# Patient Record
Sex: FEMALE | Race: WHITE
[De-identification: names, ages, dates, MRNs, and addresses within clinical notes are randomized per-mention and may not be internally consistent; named-entity substitution may affect disease eponyms.]

---

## 2018-02-06 ENCOUNTER — HOSPITAL ENCOUNTER (EMERGENCY)
Dept: HOSPITAL 94 - ER | Age: 38
Discharge: HOME | End: 2018-02-06
Payer: MEDICAID

## 2018-02-06 VITALS — BODY MASS INDEX: 27.55 KG/M2 | HEIGHT: 60 IN | WEIGHT: 140.3 LBS

## 2018-02-06 VITALS — SYSTOLIC BLOOD PRESSURE: 113 MMHG | DIASTOLIC BLOOD PRESSURE: 61 MMHG

## 2018-02-06 DIAGNOSIS — Z88.5: ICD-10-CM

## 2018-02-06 DIAGNOSIS — F17.210: ICD-10-CM

## 2018-02-06 DIAGNOSIS — M54.6: Primary | ICD-10-CM

## 2018-02-06 DIAGNOSIS — Z79.899: ICD-10-CM

## 2018-02-06 DIAGNOSIS — J45.909: ICD-10-CM

## 2018-02-06 DIAGNOSIS — K21.9: ICD-10-CM

## 2018-02-06 LAB
ALBUMIN SERPL BCP-MCNC: 3.1 G/DL (ref 3.4–5)
ALBUMIN/GLOB SERPL: 0.7 {RATIO} (ref 1.1–1.5)
ALP SERPL-CCNC: 70 IU/L (ref 46–116)
ALT SERPL W P-5'-P-CCNC: 11 U/L (ref 12–78)
ANION GAP SERPL CALCULATED.3IONS-SCNC: 11 MMOL/L (ref 8–16)
AST SERPL W P-5'-P-CCNC: 4 U/L (ref 10–37)
BASOPHILS # BLD AUTO: 0 X10'3 (ref 0–0.2)
BASOPHILS NFR BLD AUTO: 0.2 % (ref 0–1)
BILIRUB SERPL-MCNC: 0.6 MG/DL (ref 0.1–1)
BUN SERPL-MCNC: 8 MG/DL (ref 7–18)
BUN/CREAT SERPL: 13.3 (ref 6.6–38)
CALCIUM SERPL-MCNC: 8.6 MG/DL (ref 8.5–10.1)
CHLORIDE SERPL-SCNC: 104 MMOL/L (ref 99–107)
CO2 SERPL-SCNC: 24.4 MMOL/L (ref 24–32)
CREAT SERPL-MCNC: 0.6 MG/DL (ref 0.4–0.9)
D DIMER PPP FEU-MCNC: 0.39 MG/L FEU (ref 0–0.5)
EOSINOPHIL # BLD AUTO: 0.3 X10'3 (ref 0–0.9)
EOSINOPHIL NFR BLD AUTO: 2.2 % (ref 0–6)
ERYTHROCYTE [DISTWIDTH] IN BLOOD BY AUTOMATED COUNT: 12.8 % (ref 11.5–14.5)
GFR SERPL CREATININE-BSD FRML MDRD: > 90 ML/MIN
GLUCOSE SERPL-MCNC: 123 MG/DL (ref 70–104)
HCT VFR BLD AUTO: 36.8 % (ref 35–45)
HGB BLD-MCNC: 12.8 G/DL (ref 12–16)
LYMPHOCYTES # BLD AUTO: 1.7 X10'3 (ref 1.1–4.8)
LYMPHOCYTES NFR BLD AUTO: 12 % (ref 21–51)
MAGNESIUM SERPL-MCNC: 1.5 MG/DL (ref 1.5–2.4)
MCH RBC QN AUTO: 31 PG (ref 27–31)
MCHC RBC AUTO-ENTMCNC: 34.9 % (ref 33–36.5)
MCV RBC AUTO: 88.9 FL (ref 78–98)
MONOCYTES # BLD AUTO: 0.9 X10'3 (ref 0–0.9)
MONOCYTES NFR BLD AUTO: 6 % (ref 2–12)
NEUTROPHILS # BLD AUTO: 11.3 X10'3 (ref 1.8–7.7)
NEUTROPHILS NFR BLD AUTO: 79.6 % (ref 42–75)
PLATELET # BLD AUTO: 202 X10'3 (ref 140–440)
PMV BLD AUTO: 9.5 FL (ref 7.4–10.4)
POTASSIUM SERPL-SCNC: 2.7 MMOL/L (ref 3.5–5.1)
PROT SERPL-MCNC: 7.4 G/DL (ref 6.4–8.2)
RBC # BLD AUTO: 4.14 X10'6 (ref 4.2–5.6)
SODIUM SERPL-SCNC: 139 MMOL/L (ref 135–145)
WBC # BLD AUTO: 14.2 X10'3 (ref 4.5–11)

## 2018-02-06 PROCEDURE — 96375 TX/PRO/DX INJ NEW DRUG ADDON: CPT

## 2018-02-06 PROCEDURE — 96376 TX/PRO/DX INJ SAME DRUG ADON: CPT

## 2018-02-06 PROCEDURE — 83735 ASSAY OF MAGNESIUM: CPT

## 2018-02-06 PROCEDURE — 84484 ASSAY OF TROPONIN QUANT: CPT

## 2018-02-06 PROCEDURE — 85025 COMPLETE CBC W/AUTO DIFF WBC: CPT

## 2018-02-06 PROCEDURE — 71045 X-RAY EXAM CHEST 1 VIEW: CPT

## 2018-02-06 PROCEDURE — 80053 COMPREHEN METABOLIC PANEL: CPT

## 2018-02-06 PROCEDURE — 36415 COLL VENOUS BLD VENIPUNCTURE: CPT

## 2018-02-06 PROCEDURE — 96374 THER/PROPH/DIAG INJ IV PUSH: CPT

## 2018-02-06 PROCEDURE — 99285 EMERGENCY DEPT VISIT HI MDM: CPT

## 2018-02-06 PROCEDURE — 93005 ELECTROCARDIOGRAM TRACING: CPT

## 2018-02-06 PROCEDURE — 85379 FIBRIN DEGRADATION QUANT: CPT

## 2018-11-14 ENCOUNTER — HOSPITAL ENCOUNTER (EMERGENCY)
Dept: HOSPITAL 94 - ER | Age: 38
Discharge: LEFT BEFORE DISCHARGE | End: 2018-11-14
Payer: MEDICAID

## 2018-11-14 VITALS — DIASTOLIC BLOOD PRESSURE: 62 MMHG | SYSTOLIC BLOOD PRESSURE: 128 MMHG

## 2018-11-14 VITALS — WEIGHT: 156.53 LBS | HEIGHT: 60 IN | BODY MASS INDEX: 30.73 KG/M2

## 2018-11-14 DIAGNOSIS — Z53.21: ICD-10-CM

## 2018-11-14 DIAGNOSIS — M25.511: Primary | ICD-10-CM

## 2019-03-26 ENCOUNTER — HOSPITAL ENCOUNTER (EMERGENCY)
Dept: HOSPITAL 94 - ER | Age: 39
Discharge: HOME | End: 2019-03-26
Payer: MEDICAID

## 2019-03-26 VITALS — BODY MASS INDEX: 28.13 KG/M2 | WEIGHT: 143.3 LBS | HEIGHT: 60 IN

## 2019-03-26 VITALS — DIASTOLIC BLOOD PRESSURE: 40 MMHG | SYSTOLIC BLOOD PRESSURE: 94 MMHG

## 2019-03-26 DIAGNOSIS — Z79.899: ICD-10-CM

## 2019-03-26 DIAGNOSIS — F12.90: ICD-10-CM

## 2019-03-26 DIAGNOSIS — F17.200: ICD-10-CM

## 2019-03-26 DIAGNOSIS — K52.89: Primary | ICD-10-CM

## 2019-03-26 DIAGNOSIS — Z87.442: ICD-10-CM

## 2019-03-26 DIAGNOSIS — K21.9: ICD-10-CM

## 2019-03-26 DIAGNOSIS — R10.12: ICD-10-CM

## 2019-03-26 DIAGNOSIS — Z88.8: ICD-10-CM

## 2019-03-26 DIAGNOSIS — J45.909: ICD-10-CM

## 2019-03-26 DIAGNOSIS — R10.31: ICD-10-CM

## 2019-03-26 DIAGNOSIS — Z88.5: ICD-10-CM

## 2019-03-26 LAB
ALBUMIN SERPL BCP-MCNC: 4 G/DL (ref 3.4–5)
ALBUMIN/GLOB SERPL: 1.1 {RATIO} (ref 1.1–1.5)
ALP SERPL-CCNC: 71 IU/L (ref 46–116)
ALT SERPL W P-5'-P-CCNC: 16 U/L (ref 12–78)
ANION GAP SERPL CALCULATED.3IONS-SCNC: 14 MMOL/L (ref 8–16)
AST SERPL W P-5'-P-CCNC: 9 U/L (ref 10–37)
BACTERIA URNS QL MICRO: (no result) /HPF
BASOPHILS # BLD AUTO: 0.1 X10'3 (ref 0–0.2)
BASOPHILS NFR BLD AUTO: 0.6 % (ref 0–1)
BASOPHILS NFR BLD MANUAL: 1 % (ref 0–1)
BILIRUB SERPL-MCNC: 0.6 MG/DL (ref 0.1–1)
BUN SERPL-MCNC: 17 MG/DL (ref 7–18)
BUN/CREAT SERPL: 22.7 (ref 6.6–38)
CALCIUM SERPL-MCNC: 9.2 MG/DL (ref 8.5–10.1)
CHLORIDE SERPL-SCNC: 104 MMOL/L (ref 99–107)
CLARITY UR: CLEAR
CO2 SERPL-SCNC: 23.2 MMOL/L (ref 24–32)
COLOR UR: YELLOW
CREAT SERPL-MCNC: 0.75 MG/DL (ref 0.4–0.9)
DEPRECATED SQUAMOUS URNS QL MICRO: (no result) /LPF
EOSINOPHIL # BLD AUTO: 0.2 X10'3 (ref 0–0.9)
EOSINOPHIL NFR BLD AUTO: 1.6 % (ref 0–6)
EOSINOPHIL NFR BLD MANUAL: 1 % (ref 0–6)
ERYTHROCYTE [DISTWIDTH] IN BLOOD BY AUTOMATED COUNT: 13.6 % (ref 11.5–14.5)
GFR SERPL CREATININE-BSD FRML MDRD: 87 ML/MIN
GLUCOSE SERPL-MCNC: 93 MG/DL (ref 70–104)
GLUCOSE UR STRIP-MCNC: NEGATIVE MG/DL
HCG UR QL: NEGATIVE
HCT VFR BLD AUTO: 44.6 % (ref 35–45)
HGB BLD-MCNC: 15.3 G/DL (ref 12–16)
HGB UR QL STRIP: NEGATIVE
INR PPP: 1 INR
KETONES UR STRIP-MCNC: 40 MG/DL
LEUKOCYTE ESTERASE UR QL STRIP: NEGATIVE
LG PLATELETS BLD QL SMEAR: (no result)
LIPASE SERPL-CCNC: 131 U/L (ref 73–393)
LYMPHOCYTES # BLD AUTO: 2.1 X10'3 (ref 1.1–4.8)
LYMPHOCYTES NFR BLD AUTO: 14.2 % (ref 21–51)
LYMPHOCYTES NFR BLD MANUAL: 13 % (ref 21–51)
MCH RBC QN AUTO: 30.9 PG (ref 27–31)
MCHC RBC AUTO-ENTMCNC: 34.3 G/DL (ref 33–36.5)
MCV RBC AUTO: 90.1 FL (ref 78–98)
MONOCYTES # BLD AUTO: 1 X10'3 (ref 0–0.9)
MONOCYTES NFR BLD AUTO: 7.2 % (ref 2–12)
MONOCYTES NFR BLD MANUAL: 5 % (ref 2–12)
MUCOUS THREADS URNS QL MICRO: (no result) /LPF
NEUTROPHILS # BLD AUTO: 11.2 X10'3 (ref 1.8–7.7)
NEUTROPHILS NFR BLD AUTO: 76.4 % (ref 42–75)
NEUTS BAND # BLD MANUAL: 3 % (ref 0–10)
NEUTS SEG NFR BLD MANUAL: 75 % (ref 42–75)
NEUTS VAC BLD QL SMEAR: (no result)
NITRITE UR QL STRIP: NEGATIVE
PH UR STRIP: 7 [PH] (ref 4.8–8)
PLATELET # BLD AUTO: 197 X10'3 (ref 140–440)
PLATELET BLD QL SMEAR: NORMAL
PMV BLD AUTO: 10.8 FL (ref 7.4–10.4)
POTASSIUM SERPL-SCNC: 3.6 MMOL/L (ref 3.5–5.1)
PROT SERPL-MCNC: 7.7 G/DL (ref 6.4–8.2)
PROT UR QL STRIP: (no result) MG/DL
PROTHROMBIN TIME: 10 SECONDS (ref 9–12)
RBC # BLD AUTO: 4.95 X10'6 (ref 4.2–5.6)
RBC #/AREA URNS HPF: (no result) /HPF (ref 0–2)
RBC MORPH BLD: NORMAL
SODIUM SERPL-SCNC: 141 MMOL/L (ref 135–145)
SP GR UR STRIP: 1.01 (ref 1–1.03)
TOTAL CELLS COUNTED FLD: 100
TOXIC GRANULES BLD QL SMEAR: (no result)
URN COLLECT METHOD CLASS: (no result)
UROBILINOGEN UR STRIP-MCNC: 0.2 E.U/DL (ref 0.2–1)
VARIANT LYMPHS NFR BLD MANUAL: 2 % (ref 0–0)
WBC # BLD AUTO: 14.6 X10'3 (ref 4.5–11)
WBC #/AREA URNS HPF: (no result) /HPF (ref 0–4)

## 2019-03-26 PROCEDURE — 36415 COLL VENOUS BLD VENIPUNCTURE: CPT

## 2019-03-26 PROCEDURE — 83690 ASSAY OF LIPASE: CPT

## 2019-03-26 PROCEDURE — 96375 TX/PRO/DX INJ NEW DRUG ADDON: CPT

## 2019-03-26 PROCEDURE — 96374 THER/PROPH/DIAG INJ IV PUSH: CPT

## 2019-03-26 PROCEDURE — 81001 URINALYSIS AUTO W/SCOPE: CPT

## 2019-03-26 PROCEDURE — 85025 COMPLETE CBC W/AUTO DIFF WBC: CPT

## 2019-03-26 PROCEDURE — 85610 PROTHROMBIN TIME: CPT

## 2019-03-26 PROCEDURE — 99283 EMERGENCY DEPT VISIT LOW MDM: CPT

## 2019-03-26 PROCEDURE — 81025 URINE PREGNANCY TEST: CPT

## 2019-03-26 PROCEDURE — 80053 COMPREHEN METABOLIC PANEL: CPT

## 2019-04-10 ENCOUNTER — HOSPITAL ENCOUNTER (EMERGENCY)
Dept: HOSPITAL 94 - ER | Age: 39
Discharge: HOME | End: 2019-04-10
Payer: COMMERCIAL

## 2019-04-10 VITALS — HEIGHT: 60 IN | WEIGHT: 145.31 LBS | BODY MASS INDEX: 28.53 KG/M2

## 2019-04-10 VITALS — SYSTOLIC BLOOD PRESSURE: 118 MMHG | DIASTOLIC BLOOD PRESSURE: 89 MMHG
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
## 2019-04-10 DIAGNOSIS — Z87.442: ICD-10-CM

## 2019-04-10 DIAGNOSIS — K21.9: ICD-10-CM
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
## 2019-04-10 DIAGNOSIS — J45.909: ICD-10-CM

## 2019-04-10 DIAGNOSIS — Z88.5: ICD-10-CM

## 2019-04-10 DIAGNOSIS — Z79.899: ICD-10-CM
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
## 2019-04-10 DIAGNOSIS — S29.012A: ICD-10-CM

## 2019-04-10 DIAGNOSIS — S13.4XXA: Primary | ICD-10-CM
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
## 2019-04-10 DIAGNOSIS — R51: ICD-10-CM

## 2019-04-10 DIAGNOSIS — Z88.8: ICD-10-CM

## 2019-04-10 LAB — HCG UR QL: NEGATIVE

## 2019-04-10 PROCEDURE — 72125 CT NECK SPINE W/O DYE: CPT

## 2019-04-10 PROCEDURE — 72131 CT LUMBAR SPINE W/O DYE: CPT

## 2019-04-10 PROCEDURE — 99284 EMERGENCY DEPT VISIT MOD MDM: CPT

## 2019-04-10 PROCEDURE — 72128 CT CHEST SPINE W/O DYE: CPT

## 2019-04-10 PROCEDURE — 81025 URINE PREGNANCY TEST: CPT

## 2019-04-10 PROCEDURE — 96372 THER/PROPH/DIAG INJ SC/IM: CPT

## 2019-04-10 NOTE — NUR
Pt came out of room without C-Collar on, Pt was strongly advised that she 
should keep the collar on until the CT results have returned. Pt verbalized 
understanding of the instructions however did not put it back on.

## 2019-08-07 ENCOUNTER — HOSPITAL ENCOUNTER (EMERGENCY)
Dept: HOSPITAL 94 - ER | Age: 39
Discharge: HOME | End: 2019-08-07
Payer: COMMERCIAL

## 2019-08-07 VITALS — BODY MASS INDEX: 29.86 KG/M2 | HEIGHT: 60 IN | WEIGHT: 152.12 LBS

## 2019-08-07 VITALS — SYSTOLIC BLOOD PRESSURE: 114 MMHG | DIASTOLIC BLOOD PRESSURE: 72 MMHG

## 2019-08-07 DIAGNOSIS — Z88.5: ICD-10-CM

## 2019-08-07 DIAGNOSIS — J45.909: ICD-10-CM

## 2019-08-07 DIAGNOSIS — Z98.890: ICD-10-CM

## 2019-08-07 DIAGNOSIS — K08.89: Primary | ICD-10-CM

## 2019-08-07 DIAGNOSIS — K21.9: ICD-10-CM

## 2019-08-07 DIAGNOSIS — Z87.442: ICD-10-CM

## 2019-08-07 DIAGNOSIS — Z79.899: ICD-10-CM

## 2019-08-07 DIAGNOSIS — Z88.8: ICD-10-CM

## 2019-08-07 PROCEDURE — 99283 EMERGENCY DEPT VISIT LOW MDM: CPT

## 2019-08-25 ENCOUNTER — HOSPITAL ENCOUNTER (EMERGENCY)
Dept: HOSPITAL 94 - ER | Age: 39
Discharge: HOME | End: 2019-08-25
Payer: SELF-PAY

## 2019-08-25 VITALS — DIASTOLIC BLOOD PRESSURE: 76 MMHG | SYSTOLIC BLOOD PRESSURE: 114 MMHG

## 2019-08-25 VITALS — HEIGHT: 60 IN | WEIGHT: 150.31 LBS | BODY MASS INDEX: 29.51 KG/M2
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
## 2019-08-25 DIAGNOSIS — Z88.8: ICD-10-CM

## 2019-08-25 DIAGNOSIS — Z88.5: ICD-10-CM

## 2019-08-25 DIAGNOSIS — J45.909: ICD-10-CM

## 2019-08-25 DIAGNOSIS — Z98.890: ICD-10-CM

## 2019-08-25 DIAGNOSIS — Z87.442: ICD-10-CM

## 2019-08-25 DIAGNOSIS — T18.8XXA: Primary | ICD-10-CM

## 2019-08-25 DIAGNOSIS — K21.9: ICD-10-CM

## 2019-08-25 DIAGNOSIS — Z79.899: ICD-10-CM
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
## 2019-08-25 PROCEDURE — 71045 X-RAY EXAM CHEST 1 VIEW: CPT

## 2019-08-25 PROCEDURE — 99283 EMERGENCY DEPT VISIT LOW MDM: CPT

## 2019-08-25 PROCEDURE — 74018 RADEX ABDOMEN 1 VIEW: CPT

## 2019-08-25 PROCEDURE — 70360 X-RAY EXAM OF NECK: CPT

## 2021-05-21 ENCOUNTER — HOSPITAL ENCOUNTER (EMERGENCY)
Dept: HOSPITAL 94 - ER | Age: 41
LOS: 1 days | Discharge: HOME | End: 2021-05-22
Payer: MEDICAID

## 2021-05-21 VITALS — BODY MASS INDEX: 26.49 KG/M2 | HEIGHT: 61 IN | WEIGHT: 140.3 LBS

## 2021-05-21 DIAGNOSIS — N83.201: Primary | ICD-10-CM

## 2021-05-21 DIAGNOSIS — Z88.8: ICD-10-CM

## 2021-05-21 DIAGNOSIS — K21.9: ICD-10-CM

## 2021-05-21 DIAGNOSIS — Z87.442: ICD-10-CM

## 2021-05-21 DIAGNOSIS — Z79.899: ICD-10-CM

## 2021-05-21 DIAGNOSIS — Z88.5: ICD-10-CM

## 2021-05-21 DIAGNOSIS — J45.909: ICD-10-CM

## 2021-05-21 LAB
ALBUMIN SERPL BCP-MCNC: 3.7 G/DL (ref 3.4–5)
ALBUMIN/GLOB SERPL: 1.1 {RATIO} (ref 1.1–1.5)
ALP SERPL-CCNC: 64 IU/L (ref 46–116)
ALT SERPL W P-5'-P-CCNC: 18 U/L (ref 12–78)
ANION GAP SERPL CALCULATED.3IONS-SCNC: 14 MMOL/L (ref 8–16)
AST SERPL W P-5'-P-CCNC: 11 U/L (ref 10–37)
BASOPHILS # BLD AUTO: 0.2 X10'3 (ref 0–0.2)
BASOPHILS NFR BLD AUTO: 1.6 % (ref 0–1)
BILIRUB SERPL-MCNC: 0.6 MG/DL (ref 0.1–1)
BUN SERPL-MCNC: 8 MG/DL (ref 7–18)
BUN/CREAT SERPL: 11 (ref 6.6–38)
CALCIUM SERPL-MCNC: 8.7 MG/DL (ref 8.5–10.1)
CHLORIDE SERPL-SCNC: 106 MMOL/L (ref 99–107)
CLARITY UR: CLEAR
CO2 SERPL-SCNC: 22.1 MMOL/L (ref 24–32)
COLOR UR: YELLOW
CREAT SERPL-MCNC: 0.73 MG/DL (ref 0.4–0.9)
EOSINOPHIL # BLD AUTO: 0.2 X10'3 (ref 0–0.9)
EOSINOPHIL NFR BLD AUTO: 1.5 % (ref 0–6)
ERYTHROCYTE [DISTWIDTH] IN BLOOD BY AUTOMATED COUNT: 13.3 % (ref 11.5–14.5)
GFR SERPL CREATININE-BSD FRML MDRD: 89 ML/MIN
GLUCOSE SERPL-MCNC: 90 MG/DL (ref 70–104)
GLUCOSE UR STRIP-MCNC: NEGATIVE MG/DL
HCG UR QL: NEGATIVE
HCT VFR BLD AUTO: 39.5 % (ref 35–45)
HGB BLD-MCNC: 13.4 G/DL (ref 12–16)
HGB UR QL STRIP: NEGATIVE
KETONES UR STRIP-MCNC: 15 MG/DL
LEUKOCYTE ESTERASE UR QL STRIP: NEGATIVE
LIPASE SERPL-CCNC: < 50 U/L (ref 73–393)
LYMPHOCYTES # BLD AUTO: 1.5 X10'3 (ref 1.1–4.8)
LYMPHOCYTES NFR BLD AUTO: 13.5 % (ref 21–51)
MCH RBC QN AUTO: 32.4 PG (ref 27–31)
MCHC RBC AUTO-ENTMCNC: 34 G/DL (ref 33–36.5)
MCV RBC AUTO: 95.3 FL (ref 78–98)
MONOCYTES # BLD AUTO: 0.5 X10'3 (ref 0–0.9)
MONOCYTES NFR BLD AUTO: 4.8 % (ref 2–12)
NEUTROPHILS # BLD AUTO: 8.8 X10'3 (ref 1.8–7.7)
NEUTROPHILS NFR BLD AUTO: 78.6 % (ref 42–75)
NITRITE UR QL STRIP: NEGATIVE
PH UR STRIP: 6 [PH] (ref 4.8–8)
PLATELET # BLD AUTO: 180 X10'3 (ref 140–440)
PMV BLD AUTO: 10.7 FL (ref 7.4–10.4)
POTASSIUM SERPL-SCNC: 3.3 MMOL/L (ref 3.5–5.1)
PROT SERPL-MCNC: 7.1 G/DL (ref 6.4–8.2)
PROT UR QL STRIP: NEGATIVE MG/DL
RBC # BLD AUTO: 4.15 X10'6 (ref 4.2–5.6)
SODIUM SERPL-SCNC: 142 MMOL/L (ref 135–145)
SP GR UR STRIP: 1.02 (ref 1–1.03)
URN COLLECT METHOD CLASS: (no result)
UROBILINOGEN UR STRIP-MCNC: 0.2 E.U/DL (ref 0.2–1)
WBC # BLD AUTO: 11.2 X10'3 (ref 4.5–11)

## 2021-05-21 PROCEDURE — 81003 URINALYSIS AUTO W/O SCOPE: CPT

## 2021-05-21 PROCEDURE — 96372 THER/PROPH/DIAG INJ SC/IM: CPT

## 2021-05-21 PROCEDURE — 85025 COMPLETE CBC W/AUTO DIFF WBC: CPT

## 2021-05-21 PROCEDURE — 93976 VASCULAR STUDY: CPT

## 2021-05-21 PROCEDURE — 83690 ASSAY OF LIPASE: CPT

## 2021-05-21 PROCEDURE — 96361 HYDRATE IV INFUSION ADD-ON: CPT

## 2021-05-21 PROCEDURE — 76856 US EXAM PELVIC COMPLETE: CPT

## 2021-05-21 PROCEDURE — 74176 CT ABD & PELVIS W/O CONTRAST: CPT

## 2021-05-21 PROCEDURE — 99285 EMERGENCY DEPT VISIT HI MDM: CPT

## 2021-05-21 PROCEDURE — 96376 TX/PRO/DX INJ SAME DRUG ADON: CPT

## 2021-05-21 PROCEDURE — 81025 URINE PREGNANCY TEST: CPT

## 2021-05-21 PROCEDURE — 96365 THER/PROPH/DIAG IV INF INIT: CPT

## 2021-05-21 PROCEDURE — 96375 TX/PRO/DX INJ NEW DRUG ADDON: CPT

## 2021-05-21 PROCEDURE — 80053 COMPREHEN METABOLIC PANEL: CPT

## 2021-05-22 VITALS — SYSTOLIC BLOOD PRESSURE: 101 MMHG | DIASTOLIC BLOOD PRESSURE: 67 MMHG

## 2022-09-24 ENCOUNTER — HOSPITAL ENCOUNTER (EMERGENCY)
Dept: HOSPITAL 94 - ER | Age: 42
LOS: 1 days | Discharge: LEFT BEFORE DISCHARGE | End: 2022-09-25
Payer: MEDICAID

## 2022-09-24 VITALS — BODY MASS INDEX: 28.44 KG/M2 | WEIGHT: 141.1 LBS | HEIGHT: 59 IN

## 2022-09-24 VITALS — DIASTOLIC BLOOD PRESSURE: 77 MMHG | SYSTOLIC BLOOD PRESSURE: 121 MMHG

## 2022-09-24 DIAGNOSIS — Z53.21: ICD-10-CM

## 2022-09-24 DIAGNOSIS — N20.0: Primary | ICD-10-CM

## 2022-09-24 LAB
ALBUMIN SERPL BCP-MCNC: 3.6 G/DL (ref 3.4–5)
ALBUMIN/GLOB SERPL: 1 {RATIO} (ref 1.1–1.5)
ALP SERPL-CCNC: 67 IU/L (ref 46–116)
ALT SERPL W P-5'-P-CCNC: 14 U/L (ref 12–78)
ANION GAP SERPL CALCULATED.3IONS-SCNC: 7 MMOL/L (ref 8–16)
AST SERPL W P-5'-P-CCNC: 12 U/L (ref 10–37)
BACTERIA URNS QL MICRO: (no result) /HPF
BASOPHILS # BLD AUTO: 0.1 X10'3 (ref 0–0.2)
BASOPHILS NFR BLD AUTO: 0.9 % (ref 0–1)
BILIRUB SERPL-MCNC: 0.4 MG/DL (ref 0.1–1)
BUN SERPL-MCNC: 8 MG/DL (ref 7–18)
BUN/CREAT SERPL: 11.4 (ref 6.6–38)
CALCIUM SERPL-MCNC: 8.8 MG/DL (ref 8.5–10.1)
CAOX CRY URNS QL MICRO: (no result) /HPF
CHLORIDE SERPL-SCNC: 105 MMOL/L (ref 99–107)
CLARITY UR: (no result)
CO2 SERPL-SCNC: 30.3 MMOL/L (ref 24–32)
COLOR UR: YELLOW
CREAT SERPL-MCNC: 0.7 MG/DL (ref 0.4–0.9)
DEPRECATED SQUAMOUS URNS QL MICRO: (no result) /LPF
EOSINOPHIL # BLD AUTO: 0.2 X10'3 (ref 0–0.9)
EOSINOPHIL NFR BLD AUTO: 2.4 % (ref 0–6)
ERYTHROCYTE [DISTWIDTH] IN BLOOD BY AUTOMATED COUNT: 12.9 % (ref 11.5–14.5)
GFR SERPL CREATININE-BSD FRML MDRD: > 90 ML/MIN
GLUCOSE SERPL-MCNC: 101 MG/DL (ref 70–104)
GLUCOSE UR STRIP-MCNC: NEGATIVE MG/DL
HCG UR QL: NEGATIVE
HCT VFR BLD AUTO: 39.2 % (ref 35–45)
HGB BLD-MCNC: 13.6 G/DL (ref 12–16)
HGB UR QL STRIP: (no result)
KETONES UR STRIP-MCNC: (no result) MG/DL
LEUKOCYTE ESTERASE UR QL STRIP: NEGATIVE
LYMPHOCYTES # BLD AUTO: 2.4 X10'3 (ref 1.1–4.8)
LYMPHOCYTES NFR BLD AUTO: 29.3 % (ref 21–51)
MCH RBC QN AUTO: 31.8 PG (ref 27–31)
MCHC RBC AUTO-ENTMCNC: 34.6 G/DL (ref 33–36.5)
MCV RBC AUTO: 91.8 FL (ref 78–98)
MONOCYTES # BLD AUTO: 0.5 X10'3 (ref 0–0.9)
MONOCYTES NFR BLD AUTO: 5.8 % (ref 2–12)
MUCOUS THREADS URNS QL MICRO: (no result) /LPF
NEUTROPHILS # BLD AUTO: 5.1 X10'3 (ref 1.8–7.7)
NEUTROPHILS NFR BLD AUTO: 61.6 % (ref 42–75)
NITRITE UR QL STRIP: NEGATIVE
PH UR STRIP: 6 [PH] (ref 4.8–8)
PLATELET # BLD AUTO: 221 X10'3 (ref 140–440)
PMV BLD AUTO: 9.8 FL (ref 7.4–10.4)
POTASSIUM SERPL-SCNC: 3.7 MMOL/L (ref 3.5–5.1)
PROT SERPL-MCNC: 7.1 G/DL (ref 6.4–8.2)
PROT UR QL STRIP: 30 MG/DL
RBC # BLD AUTO: 4.27 X10'6 (ref 4.2–5.6)
RBC #/AREA URNS HPF: (no result) /HPF (ref 0–2)
SODIUM SERPL-SCNC: 142 MMOL/L (ref 135–145)
SP GR UR STRIP: >=1.03 (ref 1–1.03)
URN COLLECT METHOD CLASS: (no result)
UROBILINOGEN UR STRIP-MCNC: 0.2 E.U/DL (ref 0.2–1)
WBC # BLD AUTO: 8.2 X10'3 (ref 4.5–11)
WBC #/AREA URNS HPF: (no result) /HPF (ref 0–4)

## 2022-09-24 PROCEDURE — 81001 URINALYSIS AUTO W/SCOPE: CPT

## 2022-09-24 PROCEDURE — 80053 COMPREHEN METABOLIC PANEL: CPT

## 2022-09-24 PROCEDURE — 81025 URINE PREGNANCY TEST: CPT

## 2022-09-24 PROCEDURE — 85025 COMPLETE CBC W/AUTO DIFF WBC: CPT

## 2023-05-03 ENCOUNTER — HOSPITAL ENCOUNTER (EMERGENCY)
Dept: HOSPITAL 94 - ER | Age: 43
Discharge: ANOTHER HOSPITAL | End: 2023-05-03
Payer: MEDICAID

## 2023-05-03 VITALS — WEIGHT: 140.3 LBS | HEIGHT: 59 IN | BODY MASS INDEX: 28.28 KG/M2

## 2023-05-03 VITALS — DIASTOLIC BLOOD PRESSURE: 69 MMHG | SYSTOLIC BLOOD PRESSURE: 119 MMHG

## 2023-05-03 DIAGNOSIS — K21.9: ICD-10-CM

## 2023-05-03 DIAGNOSIS — J45.909: ICD-10-CM

## 2023-05-03 DIAGNOSIS — Z79.2: ICD-10-CM

## 2023-05-03 DIAGNOSIS — Z79.899: ICD-10-CM

## 2023-05-03 DIAGNOSIS — R10.31: Primary | ICD-10-CM

## 2023-05-03 DIAGNOSIS — Z79.1: ICD-10-CM

## 2023-05-03 DIAGNOSIS — Z88.5: ICD-10-CM

## 2023-05-03 DIAGNOSIS — Z87.448: ICD-10-CM

## 2023-05-03 DIAGNOSIS — R19.7: ICD-10-CM

## 2023-05-03 DIAGNOSIS — R11.2: ICD-10-CM

## 2023-05-03 DIAGNOSIS — Z88.8: ICD-10-CM

## 2023-05-03 LAB
ALBUMIN SERPL BCP-MCNC: 4.1 G/DL (ref 3.4–5)
ALBUMIN/GLOB SERPL: 1.1 {RATIO} (ref 1.1–1.5)
ALP SERPL-CCNC: 74 IU/L (ref 46–116)
ALT SERPL W P-5'-P-CCNC: 13 U/L (ref 12–78)
ANION GAP SERPL CALCULATED.3IONS-SCNC: 11 MMOL/L (ref 8–16)
AST SERPL W P-5'-P-CCNC: 6 U/L (ref 10–37)
BACTERIA URNS QL MICRO: (no result) /HPF
BASOPHILS # BLD AUTO: 0.1 X10'3 (ref 0–0.2)
BASOPHILS NFR BLD AUTO: 0.5 % (ref 0–1)
BILIRUB SERPL-MCNC: 0.9 MG/DL (ref 0.1–1)
BUN SERPL-MCNC: 12 MG/DL (ref 7–18)
BUN/CREAT SERPL: 14.1 (ref 10–20)
CALCIUM SERPL-MCNC: 9.1 MG/DL (ref 8.5–10.1)
CHLORIDE SERPL-SCNC: 102 MMOL/L (ref 99–107)
CLARITY UR: (no result)
CO2 SERPL-SCNC: 24.6 MMOL/L (ref 24–32)
COLOR UR: YELLOW
CREAT SERPL-MCNC: 0.85 MG/DL (ref 0.4–0.9)
DEPRECATED SQUAMOUS URNS QL MICRO: (no result) /LPF
EOSINOPHIL # BLD AUTO: 0.1 X10'3 (ref 0–0.9)
EOSINOPHIL NFR BLD AUTO: 0.4 % (ref 0–6)
ERYTHROCYTE [DISTWIDTH] IN BLOOD BY AUTOMATED COUNT: 13.1 % (ref 11.5–14.5)
GFR SERPL CREATININE-BSD FRML MDRD: 74 ML/MIN
GLUCOSE SERPL-MCNC: 131 MG/DL (ref 70–104)
GLUCOSE UR STRIP-MCNC: NEGATIVE MG/DL
HCG UR QL: NEGATIVE
HCT VFR BLD AUTO: 45.1 % (ref 35–45)
HGB BLD-MCNC: 15.1 G/DL (ref 12–16)
HGB UR QL STRIP: (no result)
KETONES UR STRIP-MCNC: NEGATIVE MG/DL
LEUKOCYTE ESTERASE UR QL STRIP: NEGATIVE
LIPASE SERPL-CCNC: 62 U/L (ref 73–393)
LYMPHOCYTES # BLD AUTO: 0.7 X10'3 (ref 1.1–4.8)
LYMPHOCYTES NFR BLD AUTO: 4.9 % (ref 21–51)
MCH RBC QN AUTO: 31.7 PG (ref 27–31)
MCHC RBC AUTO-ENTMCNC: 33.6 G/DL (ref 33–36.5)
MCV RBC AUTO: 94.3 FL (ref 78–98)
MONOCYTES # BLD AUTO: 0.4 X10'3 (ref 0–0.9)
MONOCYTES NFR BLD AUTO: 2.6 % (ref 2–12)
MUCOUS THREADS URNS QL MICRO: (no result) /LPF
NEUTROPHILS # BLD AUTO: 12.7 X10'3 (ref 1.8–7.7)
NEUTROPHILS NFR BLD AUTO: 91.6 % (ref 42–75)
NITRITE UR QL STRIP: NEGATIVE
PH UR STRIP: 6 [PH] (ref 4.8–8)
PLATELET # BLD AUTO: 237 X10'3 (ref 140–440)
PMV BLD AUTO: 9.8 FL (ref 7.4–10.4)
POTASSIUM SERPL-SCNC: 3.9 MMOL/L (ref 3.5–5.1)
PROT SERPL-MCNC: 7.9 G/DL (ref 6.4–8.2)
PROT UR QL STRIP: NEGATIVE MG/DL
RBC # BLD AUTO: 4.78 X10'6 (ref 4.2–5.6)
RBC #/AREA URNS HPF: (no result) /HPF (ref 0–2)
SODIUM SERPL-SCNC: 138 MMOL/L (ref 135–145)
SP GR UR STRIP: >=1.03 (ref 1–1.03)
URN COLLECT METHOD CLASS: (no result)
UROBILINOGEN UR STRIP-MCNC: 0.2 E.U/DL (ref 0.2–1)
WBC # BLD AUTO: 13.8 X10'3 (ref 4.5–11)
WBC #/AREA URNS HPF: (no result) /HPF (ref 0–4)

## 2023-05-03 PROCEDURE — 96375 TX/PRO/DX INJ NEW DRUG ADDON: CPT

## 2023-05-03 PROCEDURE — 36415 COLL VENOUS BLD VENIPUNCTURE: CPT

## 2023-05-03 PROCEDURE — 96376 TX/PRO/DX INJ SAME DRUG ADON: CPT

## 2023-05-03 PROCEDURE — 81025 URINE PREGNANCY TEST: CPT

## 2023-05-03 PROCEDURE — 83690 ASSAY OF LIPASE: CPT

## 2023-05-03 PROCEDURE — 81001 URINALYSIS AUTO W/SCOPE: CPT

## 2023-05-03 PROCEDURE — 76830 TRANSVAGINAL US NON-OB: CPT

## 2023-05-03 PROCEDURE — 99285 EMERGENCY DEPT VISIT HI MDM: CPT

## 2023-05-03 PROCEDURE — 74176 CT ABD & PELVIS W/O CONTRAST: CPT

## 2023-05-03 PROCEDURE — 96361 HYDRATE IV INFUSION ADD-ON: CPT

## 2023-05-03 PROCEDURE — 80053 COMPREHEN METABOLIC PANEL: CPT

## 2023-05-03 PROCEDURE — 76856 US EXAM PELVIC COMPLETE: CPT

## 2023-05-03 PROCEDURE — 85025 COMPLETE CBC W/AUTO DIFF WBC: CPT

## 2023-05-03 PROCEDURE — 96374 THER/PROPH/DIAG INJ IV PUSH: CPT

## 2023-05-03 PROCEDURE — 93976 VASCULAR STUDY: CPT

## 2023-08-17 ENCOUNTER — HOSPITAL ENCOUNTER (INPATIENT)
Dept: HOSPITAL 94 - ER | Age: 43
LOS: 3 days | Discharge: HOME | DRG: 244 | End: 2023-08-20
Attending: INTERNAL MEDICINE | Admitting: INTERNAL MEDICINE
Payer: MEDICAID

## 2023-08-17 VITALS
HEART RATE: 119 BPM | SYSTOLIC BLOOD PRESSURE: 114 MMHG | OXYGEN SATURATION: 97 % | RESPIRATION RATE: 18 BRPM | TEMPERATURE: 98.1 F | DIASTOLIC BLOOD PRESSURE: 76 MMHG

## 2023-08-17 VITALS — TEMPERATURE: 100 F

## 2023-08-17 VITALS
TEMPERATURE: 100.9 F | SYSTOLIC BLOOD PRESSURE: 115 MMHG | DIASTOLIC BLOOD PRESSURE: 72 MMHG | RESPIRATION RATE: 18 BRPM | HEART RATE: 110 BPM | OXYGEN SATURATION: 100 %

## 2023-08-17 VITALS — HEIGHT: 60 IN | WEIGHT: 140.3 LBS | BODY MASS INDEX: 27.55 KG/M2

## 2023-08-17 VITALS — RESPIRATION RATE: 18 BRPM

## 2023-08-17 DIAGNOSIS — K57.32: Primary | ICD-10-CM

## 2023-08-17 DIAGNOSIS — K21.9: ICD-10-CM

## 2023-08-17 DIAGNOSIS — J45.909: ICD-10-CM

## 2023-08-17 DIAGNOSIS — Z71.51: ICD-10-CM

## 2023-08-17 DIAGNOSIS — Z87.442: ICD-10-CM

## 2023-08-17 DIAGNOSIS — Z71.6: ICD-10-CM

## 2023-08-17 DIAGNOSIS — G89.29: ICD-10-CM

## 2023-08-17 DIAGNOSIS — F17.210: ICD-10-CM

## 2023-08-17 DIAGNOSIS — M32.9: ICD-10-CM

## 2023-08-17 DIAGNOSIS — K56.600: ICD-10-CM

## 2023-08-17 DIAGNOSIS — E87.6: ICD-10-CM

## 2023-08-17 DIAGNOSIS — Z79.899: ICD-10-CM

## 2023-08-17 DIAGNOSIS — Z88.8: ICD-10-CM

## 2023-08-17 DIAGNOSIS — Z88.5: ICD-10-CM

## 2023-08-17 DIAGNOSIS — F12.10: ICD-10-CM

## 2023-08-17 LAB
ALBUMIN SERPL BCP-MCNC: 3.3 G/DL (ref 3.4–5)
ALBUMIN/GLOB SERPL: 1 {RATIO} (ref 1.1–1.5)
ALP SERPL-CCNC: 71 IU/L (ref 46–116)
ALT SERPL W P-5'-P-CCNC: 15 U/L (ref 12–78)
AMORPH URATE CRY #/AREA URNS HPF: (no result) /[HPF]
AMPHETAMINES UR QL SCN: NEGATIVE
ANION GAP SERPL CALCULATED.3IONS-SCNC: 10 MMOL/L (ref 8–16)
APTT PPP: 27 SECONDS (ref 22–32)
AST SERPL W P-5'-P-CCNC: 11 U/L (ref 10–37)
BACTERIA URNS QL MICRO: (no result) /HPF
BARBITURATES UR QL SCN: NEGATIVE
BASOPHILS # BLD AUTO: 0.1 X10'3 (ref 0–0.2)
BASOPHILS NFR BLD AUTO: 0.5 % (ref 0–1)
BENZODIAZ UR QL SCN: NEGATIVE
BILIRUB SERPL-MCNC: 1 MG/DL (ref 0.1–1)
BILIRUB UR QL STRIP: NEGATIVE
BUN SERPL-MCNC: 15 MG/DL (ref 7–18)
BUN/CREAT SERPL: 18.5 (ref 10–20)
BZE UR QL SCN: POSITIVE
CALCIUM SERPL-MCNC: 8.6 MG/DL (ref 8.5–10.1)
CANNABINOIDS UR QL SCN: POSITIVE
CAOX CRY URNS QL MICRO: (no result) /HPF
CHLORIDE SERPL-SCNC: 104 MMOL/L (ref 99–107)
CLARITY UR: (no result)
CO2 SERPL-SCNC: 25.1 MMOL/L (ref 24–32)
COLOR UR: YELLOW
CREAT CL PREDICTED SERPL C-G-VRATE: 66 ML/MIN
CREAT SERPL-MCNC: 0.81 MG/DL (ref 0.4–0.9)
DEPRECATED SQUAMOUS URNS QL MICRO: (no result) /LPF
EOSINOPHIL # BLD AUTO: 0 X10'3 (ref 0–0.9)
EOSINOPHIL NFR BLD AUTO: 0.2 % (ref 0–6)
ERYTHROCYTE [DISTWIDTH] IN BLOOD BY AUTOMATED COUNT: 13.9 % (ref 11.5–14.5)
ETHANOL SERPL-MCNC: < 10 MG/DL (ref ?–10)
GFR SERPL CREATININE-BSD FRML MDRD: 78 ML/MIN
GLOBULIN SER CALC-MCNC: 3.2 G/DL (ref 2.7–4.3)
GLUCOSE SERPL-MCNC: 98 MG/DL (ref 70–104)
GLUCOSE UR STRIP-MCNC: NEGATIVE MG/DL
HCG SERPL-ACNC: < 1 MIU/ML
HCG UR QL: NEGATIVE
HCT VFR BLD AUTO: 37.7 % (ref 35–45)
HGB BLD-MCNC: 12.8 G/DL (ref 12–16)
HGB UR QL STRIP: NEGATIVE
INR PPP: 0.9 INR
KETONES UR STRIP-MCNC: NEGATIVE MG/DL
LEUKOCYTE ESTERASE UR QL STRIP: NEGATIVE
LIPASE SERPL-CCNC: 54 U/L (ref 73–393)
LYMPHOCYTES # BLD AUTO: 0.9 X10'3 (ref 1.1–4.8)
LYMPHOCYTES NFR BLD AUTO: 5 % (ref 21–51)
MCH RBC QN AUTO: 32.1 PG (ref 27–31)
MCHC RBC AUTO-ENTMCNC: 33.8 G/DL (ref 33–36.5)
MCV RBC AUTO: 94.9 FL (ref 78–98)
METHADONE UR QL SCN: NEGATIVE
MONOCYTES # BLD AUTO: 1.3 X10'3 (ref 0–0.9)
MONOCYTES NFR BLD AUTO: 7.2 % (ref 2–12)
MUCOUS THREADS URNS QL MICRO: (no result) /LPF
NEUTROPHILS # BLD AUTO: 15.8 X10'3 (ref 1.8–7.7)
NEUTROPHILS NFR BLD AUTO: 87.1 % (ref 42–75)
NITRITE UR QL STRIP: NEGATIVE
OPIATES UR QL SCN: POSITIVE
PCP UR QL SCN: NEGATIVE
PH UR STRIP: 5.5 [PH] (ref 4.8–8)
PLATELET # BLD AUTO: 186 X10'3 (ref 140–440)
PMV BLD AUTO: 9.5 FL (ref 7.4–10.4)
POTASSIUM SERPL-SCNC: 3 MMOL/L (ref 3.5–5.1)
PROT SERPL-MCNC: 6.5 G/DL (ref 6.4–8.2)
PROT UR QL STRIP: NEGATIVE MG/DL
PROTHROMBIN TIME: 9.9 SECONDS (ref 9–12)
RBC # BLD AUTO: 3.98 X10'6 (ref 4.2–5.6)
RBC #/AREA URNS HPF: (no result) /HPF (ref 0–2)
SODIUM SERPL-SCNC: 139 MMOL/L (ref 135–145)
SP GR UR STRIP: 1.01 (ref 1–1.03)
TRANS CELLS URNS QL MICRO: (no result) /HPF
URN COLLECT METHOD CLASS: (no result)
UROBILINOGEN UR STRIP-MCNC: 0.2 E.U/DL (ref 0.2–1)
WBC # BLD AUTO: 18.2 X10'3 (ref 4.5–11)

## 2023-08-17 PROCEDURE — 74177 CT ABD & PELVIS W/CONTRAST: CPT

## 2023-08-17 PROCEDURE — 0D9670Z DRAINAGE OF STOMACH WITH DRAINAGE DEVICE, VIA NATURAL OR ARTIFICIAL OPENING: ICD-10-PCS | Performed by: INTERNAL MEDICINE

## 2023-08-17 PROCEDURE — 81025 URINE PREGNANCY TEST: CPT

## 2023-08-17 PROCEDURE — 84145 PROCALCITONIN (PCT): CPT

## 2023-08-17 PROCEDURE — 93976 VASCULAR STUDY: CPT

## 2023-08-17 PROCEDURE — 87081 CULTURE SCREEN ONLY: CPT

## 2023-08-17 PROCEDURE — 83605 ASSAY OF LACTIC ACID: CPT

## 2023-08-17 PROCEDURE — BW211ZZ COMPUTERIZED TOMOGRAPHY (CT SCAN) OF ABDOMEN AND PELVIS USING LOW OSMOLAR CONTRAST: ICD-10-PCS

## 2023-08-17 PROCEDURE — 87040 BLOOD CULTURE FOR BACTERIA: CPT

## 2023-08-17 PROCEDURE — 85730 THROMBOPLASTIN TIME PARTIAL: CPT

## 2023-08-17 PROCEDURE — 85651 RBC SED RATE NONAUTOMATED: CPT

## 2023-08-17 PROCEDURE — 81001 URINALYSIS AUTO W/SCOPE: CPT

## 2023-08-17 PROCEDURE — 76830 TRANSVAGINAL US NON-OB: CPT

## 2023-08-17 PROCEDURE — 83690 ASSAY OF LIPASE: CPT

## 2023-08-17 PROCEDURE — 84484 ASSAY OF TROPONIN QUANT: CPT

## 2023-08-17 PROCEDURE — 99285 EMERGENCY DEPT VISIT HI MDM: CPT

## 2023-08-17 PROCEDURE — 36415 COLL VENOUS BLD VENIPUNCTURE: CPT

## 2023-08-17 PROCEDURE — 76856 US EXAM PELVIC COMPLETE: CPT

## 2023-08-17 PROCEDURE — 93005 ELECTROCARDIOGRAM TRACING: CPT

## 2023-08-17 PROCEDURE — 85025 COMPLETE CBC W/AUTO DIFF WBC: CPT

## 2023-08-17 PROCEDURE — 85610 PROTHROMBIN TIME: CPT

## 2023-08-17 PROCEDURE — 71045 X-RAY EXAM CHEST 1 VIEW: CPT

## 2023-08-17 PROCEDURE — 80305 DRUG TEST PRSMV DIR OPT OBS: CPT

## 2023-08-17 PROCEDURE — 84702 CHORIONIC GONADOTROPIN TEST: CPT

## 2023-08-17 PROCEDURE — 80053 COMPREHEN METABOLIC PANEL: CPT

## 2023-08-17 PROCEDURE — 80320 DRUG SCREEN QUANTALCOHOLS: CPT

## 2023-08-17 RX ADMIN — DIATRIZOATE MEGLUMINE AND DIATRIZOATE SODIUM SCH ML: 660; 100 LIQUID ORAL; RECTAL at 21:21

## 2023-08-17 RX ADMIN — TAZOBACTAM SODIUM AND PIPERACILLIN SODIUM SCH MLS/HR: 375; 3 INJECTION, SOLUTION INTRAVENOUS at 18:44

## 2023-08-17 RX ADMIN — SODIUM CHLORIDE SCH MLS/HR: 9 INJECTION INTRAMUSCULAR; INTRAVENOUS; SUBCUTANEOUS at 11:40

## 2023-08-17 RX ADMIN — HYDROMORPHONE HYDROCHLORIDE PRN MG: 1 INJECTION, SOLUTION INTRAMUSCULAR; INTRAVENOUS; SUBCUTANEOUS at 21:27

## 2023-08-17 RX ADMIN — ENOXAPARIN SODIUM SCH MG: 100 INJECTION SUBCUTANEOUS at 19:32

## 2023-08-17 RX ADMIN — HYDROCODONE BITARTRATE AND ACETAMINOPHEN PRN TAB: 10; 325 TABLET ORAL at 19:33

## 2023-08-17 RX ADMIN — HYDROMORPHONE HYDROCHLORIDE PRN MG: 1 INJECTION, SOLUTION INTRAMUSCULAR; INTRAVENOUS; SUBCUTANEOUS at 17:39

## 2023-08-17 NOTE — NUR
NGT placement attempted and pt unable to tolerate. Pt requesting pain med and 
asking for numbing agent for tube placement. Dr. Dominguez paged for request

## 2023-08-17 NOTE — NUR
Patient in room [facility]. I have received report from SHIVANI Goel and had the opportunity to 
ask questions and assume patient care.

## 2023-08-18 VITALS
OXYGEN SATURATION: 96 % | SYSTOLIC BLOOD PRESSURE: 108 MMHG | TEMPERATURE: 98.7 F | RESPIRATION RATE: 17 BRPM | DIASTOLIC BLOOD PRESSURE: 70 MMHG | HEART RATE: 101 BPM

## 2023-08-18 VITALS
DIASTOLIC BLOOD PRESSURE: 83 MMHG | OXYGEN SATURATION: 96 % | SYSTOLIC BLOOD PRESSURE: 127 MMHG | HEART RATE: 119 BPM | RESPIRATION RATE: 20 BRPM | TEMPERATURE: 99.3 F

## 2023-08-18 VITALS
TEMPERATURE: 97.5 F | OXYGEN SATURATION: 97 % | RESPIRATION RATE: 16 BRPM | SYSTOLIC BLOOD PRESSURE: 121 MMHG | DIASTOLIC BLOOD PRESSURE: 78 MMHG | HEART RATE: 98 BPM

## 2023-08-18 VITALS
TEMPERATURE: 99.7 F | SYSTOLIC BLOOD PRESSURE: 124 MMHG | OXYGEN SATURATION: 97 % | DIASTOLIC BLOOD PRESSURE: 93 MMHG | HEART RATE: 112 BPM | RESPIRATION RATE: 18 BRPM

## 2023-08-18 VITALS — OXYGEN SATURATION: 97 % | RESPIRATION RATE: 16 BRPM

## 2023-08-18 LAB
ALBUMIN SERPL BCP-MCNC: 2.6 G/DL (ref 3.4–5)
ALBUMIN/GLOB SERPL: 0.8 {RATIO} (ref 1.1–1.5)
ALP SERPL-CCNC: 62 IU/L (ref 46–116)
ALT SERPL W P-5'-P-CCNC: 11 U/L (ref 12–78)
ANION GAP SERPL CALCULATED.3IONS-SCNC: 9 MMOL/L (ref 8–16)
AST SERPL W P-5'-P-CCNC: 11 U/L (ref 10–37)
BASOPHILS # BLD AUTO: 0 X10'3 (ref 0–0.2)
BASOPHILS NFR BLD AUTO: 0.1 % (ref 0–1)
BILIRUB SERPL-MCNC: 0.7 MG/DL (ref 0.1–1)
BUN SERPL-MCNC: 6 MG/DL (ref 7–18)
BUN/CREAT SERPL: 9.2 (ref 10–20)
CALCIUM SERPL-MCNC: 8 MG/DL (ref 8.5–10.1)
CHLORIDE SERPL-SCNC: 104 MMOL/L (ref 99–107)
CO2 SERPL-SCNC: 20.6 MMOL/L (ref 24–32)
CREAT CL PREDICTED SERPL C-G-VRATE: 82 ML/MIN
CREAT SERPL-MCNC: 0.65 MG/DL (ref 0.4–0.9)
EOSINOPHIL # BLD AUTO: 0.1 X10'3 (ref 0–0.9)
EOSINOPHIL NFR BLD AUTO: 0.4 % (ref 0–6)
ERYTHROCYTE [DISTWIDTH] IN BLOOD BY AUTOMATED COUNT: 13.5 % (ref 11.5–14.5)
GFR SERPL CREATININE-BSD FRML MDRD: > 90 ML/MIN
GLOBULIN SER CALC-MCNC: 3.3 G/DL (ref 2.7–4.3)
GLUCOSE SERPL-MCNC: 89 MG/DL (ref 70–104)
HCT VFR BLD AUTO: 36.5 % (ref 35–45)
HGB BLD-MCNC: 12.2 G/DL (ref 12–16)
LYMPHOCYTES # BLD AUTO: 0.8 X10'3 (ref 1.1–4.8)
LYMPHOCYTES NFR BLD AUTO: 4.6 % (ref 21–51)
MCH RBC QN AUTO: 31.7 PG (ref 27–31)
MCHC RBC AUTO-ENTMCNC: 33.5 G/DL (ref 33–36.5)
MCV RBC AUTO: 94.6 FL (ref 78–98)
MONOCYTES # BLD AUTO: 0.4 X10'3 (ref 0–0.9)
MONOCYTES NFR BLD AUTO: 2.5 % (ref 2–12)
NEUTROPHILS # BLD AUTO: 15.7 X10'3 (ref 1.8–7.7)
NEUTROPHILS NFR BLD AUTO: 92.4 % (ref 42–75)
PLATELET # BLD AUTO: 153 X10'3 (ref 140–440)
PMV BLD AUTO: 10.1 FL (ref 7.4–10.4)
POTASSIUM SERPL-SCNC: 3.6 MMOL/L (ref 3.5–5.1)
PROT SERPL-MCNC: 5.9 G/DL (ref 6.4–8.2)
RBC # BLD AUTO: 3.86 X10'6 (ref 4.2–5.6)
SODIUM SERPL-SCNC: 134 MMOL/L (ref 135–145)
WBC # BLD AUTO: 17 X10'3 (ref 4.5–11)

## 2023-08-18 PROCEDURE — BW211ZZ COMPUTERIZED TOMOGRAPHY (CT SCAN) OF ABDOMEN AND PELVIS USING LOW OSMOLAR CONTRAST: ICD-10-PCS

## 2023-08-18 RX ADMIN — HYDROMORPHONE HYDROCHLORIDE PRN MG: 1 INJECTION, SOLUTION INTRAMUSCULAR; INTRAVENOUS; SUBCUTANEOUS at 11:31

## 2023-08-18 RX ADMIN — HYDROMORPHONE HYDROCHLORIDE PRN MG: 1 INJECTION, SOLUTION INTRAMUSCULAR; INTRAVENOUS; SUBCUTANEOUS at 07:22

## 2023-08-18 RX ADMIN — SODIUM CHLORIDE SCH MLS/HR: 9 INJECTION INTRAMUSCULAR; INTRAVENOUS; SUBCUTANEOUS at 17:40

## 2023-08-18 RX ADMIN — HYDROCODONE BITARTRATE AND ACETAMINOPHEN PRN TAB: 10; 325 TABLET ORAL at 04:51

## 2023-08-18 RX ADMIN — ONDANSETRON PRN MG: 2 INJECTION, SOLUTION INTRAMUSCULAR; INTRAVENOUS at 07:29

## 2023-08-18 RX ADMIN — HYDROMORPHONE HYDROCHLORIDE PRN MG: 1 INJECTION, SOLUTION INTRAMUSCULAR; INTRAVENOUS; SUBCUTANEOUS at 20:35

## 2023-08-18 RX ADMIN — HYDROCODONE BITARTRATE AND ACETAMINOPHEN PRN TAB: 10; 325 TABLET ORAL at 18:46

## 2023-08-18 RX ADMIN — HYDROCODONE BITARTRATE AND ACETAMINOPHEN PRN TAB: 10; 325 TABLET ORAL at 14:22

## 2023-08-18 RX ADMIN — TAZOBACTAM SODIUM AND PIPERACILLIN SODIUM SCH MLS/HR: 375; 3 INJECTION, SOLUTION INTRAVENOUS at 09:48

## 2023-08-18 RX ADMIN — ENOXAPARIN SODIUM SCH MG: 100 INJECTION SUBCUTANEOUS at 20:36

## 2023-08-18 RX ADMIN — SODIUM CHLORIDE SCH MLS/HR: 9 INJECTION INTRAMUSCULAR; INTRAVENOUS; SUBCUTANEOUS at 20:37

## 2023-08-18 RX ADMIN — HYDROMORPHONE HYDROCHLORIDE PRN MG: 1 INJECTION, SOLUTION INTRAMUSCULAR; INTRAVENOUS; SUBCUTANEOUS at 02:00

## 2023-08-18 RX ADMIN — SODIUM CHLORIDE SCH MLS/HR: 9 INJECTION INTRAMUSCULAR; INTRAVENOUS; SUBCUTANEOUS at 07:40

## 2023-08-18 RX ADMIN — HYDROMORPHONE HYDROCHLORIDE PRN MG: 1 INJECTION, SOLUTION INTRAMUSCULAR; INTRAVENOUS; SUBCUTANEOUS at 15:53

## 2023-08-18 RX ADMIN — DIATRIZOATE MEGLUMINE AND DIATRIZOATE SODIUM SCH ML: 660; 100 LIQUID ORAL; RECTAL at 07:22

## 2023-08-18 RX ADMIN — HYDROCODONE BITARTRATE AND ACETAMINOPHEN PRN TAB: 10; 325 TABLET ORAL at 00:15

## 2023-08-18 RX ADMIN — SODIUM CHLORIDE SCH MLS/HR: 9 INJECTION INTRAMUSCULAR; INTRAVENOUS; SUBCUTANEOUS at 00:13

## 2023-08-18 RX ADMIN — HYDROCODONE BITARTRATE AND ACETAMINOPHEN PRN TAB: 10; 325 TABLET ORAL at 23:40

## 2023-08-18 RX ADMIN — HYDROCODONE BITARTRATE AND ACETAMINOPHEN PRN TAB: 10; 325 TABLET ORAL at 10:21

## 2023-08-18 RX ADMIN — TAZOBACTAM SODIUM AND PIPERACILLIN SODIUM SCH MLS/HR: 375; 3 INJECTION, SOLUTION INTRAVENOUS at 00:13

## 2023-08-18 RX ADMIN — DIATRIZOATE MEGLUMINE AND DIATRIZOATE SODIUM SCH ML: 660; 100 LIQUID ORAL; RECTAL at 21:00

## 2023-08-18 RX ADMIN — TAZOBACTAM SODIUM AND PIPERACILLIN SODIUM SCH MLS/HR: 375; 3 INJECTION, SOLUTION INTRAVENOUS at 15:53

## 2023-08-18 RX ADMIN — TAZOBACTAM SODIUM AND PIPERACILLIN SODIUM SCH MLS/HR: 375; 3 INJECTION, SOLUTION INTRAVENOUS at 23:41

## 2023-08-18 NOTE — NUR
Rn got a call from Dr Myers after he read CT scan, reported patient has acute diverticulitis 
and samll micro perforation found in CT.  stated he is going to send over report. RN 
called Dr Sparrow cell phone to make aware of findings. Woman who answered said he will have 
to call back due to being busy at the moment. RN then paged Magu to make aware of CT verbal 
report

## 2023-08-18 NOTE — NUR
Problems reprioritized. Patient report given, questions answered & plan of care reviewed 
with SHIVANI Goel.

## 2023-08-19 VITALS
DIASTOLIC BLOOD PRESSURE: 79 MMHG | TEMPERATURE: 92.7 F | HEART RATE: 97 BPM | SYSTOLIC BLOOD PRESSURE: 121 MMHG | OXYGEN SATURATION: 96 % | RESPIRATION RATE: 15 BRPM

## 2023-08-19 VITALS
HEART RATE: 89 BPM | SYSTOLIC BLOOD PRESSURE: 124 MMHG | OXYGEN SATURATION: 95 % | TEMPERATURE: 98.3 F | RESPIRATION RATE: 16 BRPM | DIASTOLIC BLOOD PRESSURE: 82 MMHG

## 2023-08-19 VITALS
TEMPERATURE: 96.9 F | HEART RATE: 101 BPM | OXYGEN SATURATION: 94 % | DIASTOLIC BLOOD PRESSURE: 74 MMHG | SYSTOLIC BLOOD PRESSURE: 118 MMHG | RESPIRATION RATE: 14 BRPM

## 2023-08-19 VITALS — RESPIRATION RATE: 16 BRPM | OXYGEN SATURATION: 96 %

## 2023-08-19 VITALS
OXYGEN SATURATION: 98 % | RESPIRATION RATE: 14 BRPM | SYSTOLIC BLOOD PRESSURE: 118 MMHG | TEMPERATURE: 98.4 F | HEART RATE: 90 BPM | DIASTOLIC BLOOD PRESSURE: 83 MMHG

## 2023-08-19 VITALS — RESPIRATION RATE: 14 BRPM | OXYGEN SATURATION: 95 %

## 2023-08-19 LAB
ALBUMIN SERPL BCP-MCNC: 2.7 G/DL (ref 3.4–5)
ALBUMIN/GLOB SERPL: 0.7 {RATIO} (ref 1.1–1.5)
ALP SERPL-CCNC: 71 IU/L (ref 46–116)
ALT SERPL W P-5'-P-CCNC: 13 U/L (ref 12–78)
ANION GAP SERPL CALCULATED.3IONS-SCNC: 12 MMOL/L (ref 8–16)
AST SERPL W P-5'-P-CCNC: 12 U/L (ref 10–37)
BASOPHILS # BLD AUTO: 0 X10'3 (ref 0–0.2)
BASOPHILS NFR BLD AUTO: 0.2 % (ref 0–1)
BILIRUB SERPL-MCNC: 0.7 MG/DL (ref 0.1–1)
BUN SERPL-MCNC: 6 MG/DL (ref 7–18)
BUN/CREAT SERPL: 8.1 (ref 10–20)
CALCIUM SERPL-MCNC: 8.8 MG/DL (ref 8.5–10.1)
CHLORIDE SERPL-SCNC: 103 MMOL/L (ref 99–107)
CO2 SERPL-SCNC: 22.1 MMOL/L (ref 24–32)
CREAT CL PREDICTED SERPL C-G-VRATE: 72 ML/MIN
CREAT SERPL-MCNC: 0.74 MG/DL (ref 0.4–0.9)
EOSINOPHIL # BLD AUTO: 0.3 X10'3 (ref 0–0.9)
EOSINOPHIL NFR BLD AUTO: 2.6 % (ref 0–6)
ERYTHROCYTE [DISTWIDTH] IN BLOOD BY AUTOMATED COUNT: 13.7 % (ref 11.5–14.5)
GFR SERPL CREATININE-BSD FRML MDRD: 86 ML/MIN
GLOBULIN SER CALC-MCNC: 4.1 G/DL (ref 2.7–4.3)
GLUCOSE SERPL-MCNC: 91 MG/DL (ref 70–104)
HCT VFR BLD AUTO: 38.4 % (ref 35–45)
HGB BLD-MCNC: 12.8 G/DL (ref 12–16)
LYMPHOCYTES # BLD AUTO: 1 X10'3 (ref 1.1–4.8)
LYMPHOCYTES NFR BLD AUTO: 7.9 % (ref 21–51)
MCH RBC QN AUTO: 32.1 PG (ref 27–31)
MCHC RBC AUTO-ENTMCNC: 33.5 G/DL (ref 33–36.5)
MCV RBC AUTO: 95.8 FL (ref 78–98)
MONOCYTES # BLD AUTO: 0.5 X10'3 (ref 0–0.9)
MONOCYTES NFR BLD AUTO: 3.8 % (ref 2–12)
NEUTROPHILS # BLD AUTO: 10.7 X10'3 (ref 1.8–7.7)
NEUTROPHILS NFR BLD AUTO: 85.5 % (ref 42–75)
PLATELET # BLD AUTO: 157 X10'3 (ref 140–440)
PMV BLD AUTO: 10.1 FL (ref 7.4–10.4)
POTASSIUM SERPL-SCNC: 3.7 MMOL/L (ref 3.5–5.1)
PROT SERPL-MCNC: 6.8 G/DL (ref 6.4–8.2)
RBC # BLD AUTO: 4 X10'6 (ref 4.2–5.6)
SODIUM SERPL-SCNC: 137 MMOL/L (ref 135–145)
WBC # BLD AUTO: 12.5 X10'3 (ref 4.5–11)

## 2023-08-19 RX ADMIN — ENOXAPARIN SODIUM SCH MG: 100 INJECTION SUBCUTANEOUS at 19:13

## 2023-08-19 RX ADMIN — HYDROMORPHONE HYDROCHLORIDE PRN MG: 1 INJECTION, SOLUTION INTRAMUSCULAR; INTRAVENOUS; SUBCUTANEOUS at 01:03

## 2023-08-19 RX ADMIN — HYDROCODONE BITARTRATE AND ACETAMINOPHEN PRN TAB: 10; 325 TABLET ORAL at 07:31

## 2023-08-19 RX ADMIN — HYDROMORPHONE HYDROCHLORIDE PRN MG: 1 INJECTION, SOLUTION INTRAMUSCULAR; INTRAVENOUS; SUBCUTANEOUS at 15:10

## 2023-08-19 RX ADMIN — METRONIDAZOLE SCH MLS/HR: 500 SOLUTION INTRAVENOUS at 19:13

## 2023-08-19 RX ADMIN — TAZOBACTAM SODIUM AND PIPERACILLIN SODIUM SCH MLS/HR: 375; 3 INJECTION, SOLUTION INTRAVENOUS at 07:30

## 2023-08-19 RX ADMIN — HYDROMORPHONE HYDROCHLORIDE PRN MG: 1 INJECTION, SOLUTION INTRAMUSCULAR; INTRAVENOUS; SUBCUTANEOUS at 05:51

## 2023-08-19 RX ADMIN — CIPROFLOXACIN SCH MLS/HR: 2 INJECTION, SOLUTION INTRAVENOUS at 19:24

## 2023-08-19 RX ADMIN — SODIUM CHLORIDE SCH MLS/HR: 9 INJECTION INTRAMUSCULAR; INTRAVENOUS; SUBCUTANEOUS at 05:29

## 2023-08-19 RX ADMIN — ONDANSETRON PRN MG: 2 INJECTION, SOLUTION INTRAMUSCULAR; INTRAVENOUS at 05:58

## 2023-08-19 RX ADMIN — CIPROFLOXACIN SCH MLS/HR: 2 INJECTION, SOLUTION INTRAVENOUS at 16:06

## 2023-08-19 RX ADMIN — SODIUM CHLORIDE SCH MLS/HR: 9 INJECTION INTRAMUSCULAR; INTRAVENOUS; SUBCUTANEOUS at 16:13

## 2023-08-19 RX ADMIN — HYDROCODONE BITARTRATE AND ACETAMINOPHEN PRN TAB: 10; 325 TABLET ORAL at 19:20

## 2023-08-19 RX ADMIN — HYDROMORPHONE HYDROCHLORIDE PRN MG: 1 INJECTION, SOLUTION INTRAMUSCULAR; INTRAVENOUS; SUBCUTANEOUS at 22:07

## 2023-08-19 RX ADMIN — HYDROCODONE BITARTRATE AND ACETAMINOPHEN PRN TAB: 10; 325 TABLET ORAL at 11:58

## 2023-08-19 RX ADMIN — HYDROMORPHONE HYDROCHLORIDE PRN MG: 1 INJECTION, SOLUTION INTRAMUSCULAR; INTRAVENOUS; SUBCUTANEOUS at 10:24

## 2023-08-19 NOTE — NUR
Problems reprioritized. Patient report given, questions answered & plan of care reviewed 
with SHIVANI Nash.

## 2023-08-20 VITALS
OXYGEN SATURATION: 97 % | DIASTOLIC BLOOD PRESSURE: 83 MMHG | TEMPERATURE: 99.1 F | RESPIRATION RATE: 16 BRPM | HEART RATE: 86 BPM | SYSTOLIC BLOOD PRESSURE: 120 MMHG

## 2023-08-20 VITALS — RESPIRATION RATE: 16 BRPM

## 2023-08-20 LAB
ALBUMIN SERPL BCP-MCNC: 2.2 G/DL (ref 3.4–5)
ALBUMIN/GLOB SERPL: 0.7 {RATIO} (ref 1.1–1.5)
ALP SERPL-CCNC: 48 IU/L (ref 46–116)
ALT SERPL W P-5'-P-CCNC: 9 U/L (ref 12–78)
ANION GAP SERPL CALCULATED.3IONS-SCNC: 7 MMOL/L (ref 8–16)
AST SERPL W P-5'-P-CCNC: 9 U/L (ref 10–37)
BASOPHILS # BLD AUTO: 0 X10'3 (ref 0–0.2)
BASOPHILS NFR BLD AUTO: 0.3 % (ref 0–1)
BILIRUB SERPL-MCNC: 0.5 MG/DL (ref 0.1–1)
BUN SERPL-MCNC: 2 MG/DL (ref 7–18)
BUN/CREAT SERPL: 4.1 (ref 10–20)
CALCIUM SERPL-MCNC: 8 MG/DL (ref 8.5–10.1)
CHLORIDE SERPL-SCNC: 108 MMOL/L (ref 99–107)
CO2 SERPL-SCNC: 24.1 MMOL/L (ref 24–32)
CREAT CL PREDICTED SERPL C-G-VRATE: 109 ML/MIN
CREAT SERPL-MCNC: 0.49 MG/DL (ref 0.4–0.9)
EOSINOPHIL # BLD AUTO: 0.4 X10'3 (ref 0–0.9)
EOSINOPHIL NFR BLD AUTO: 5.4 % (ref 0–6)
ERYTHROCYTE [DISTWIDTH] IN BLOOD BY AUTOMATED COUNT: 13.4 % (ref 11.5–14.5)
GFR SERPL CREATININE-BSD FRML MDRD: > 90 ML/MIN
GLOBULIN SER CALC-MCNC: 3.3 G/DL (ref 2.7–4.3)
GLUCOSE SERPL-MCNC: 100 MG/DL (ref 70–104)
HCT VFR BLD AUTO: 30.3 % (ref 35–45)
HGB BLD-MCNC: 10.4 G/DL (ref 12–16)
LYMPHOCYTES # BLD AUTO: 0.9 X10'3 (ref 1.1–4.8)
LYMPHOCYTES NFR BLD AUTO: 12.3 % (ref 21–51)
MCH RBC QN AUTO: 32.4 PG (ref 27–31)
MCHC RBC AUTO-ENTMCNC: 34.3 G/DL (ref 33–36.5)
MCV RBC AUTO: 94.3 FL (ref 78–98)
MONOCYTES # BLD AUTO: 0.5 X10'3 (ref 0–0.9)
MONOCYTES NFR BLD AUTO: 6.8 % (ref 2–12)
NEUTROPHILS # BLD AUTO: 5.7 X10'3 (ref 1.8–7.7)
NEUTROPHILS NFR BLD AUTO: 75.2 % (ref 42–75)
PLATELET # BLD AUTO: 140 X10'3 (ref 140–440)
PMV BLD AUTO: 9.4 FL (ref 7.4–10.4)
POTASSIUM SERPL-SCNC: 3.1 MMOL/L (ref 3.5–5.1)
PROT SERPL-MCNC: 5.5 G/DL (ref 6.4–8.2)
RBC # BLD AUTO: 3.22 X10'6 (ref 4.2–5.6)
SODIUM SERPL-SCNC: 139 MMOL/L (ref 135–145)
WBC # BLD AUTO: 7.6 X10'3 (ref 4.5–11)

## 2023-08-20 RX ADMIN — CIPROFLOXACIN SCH MLS/HR: 2 INJECTION, SOLUTION INTRAVENOUS at 08:39

## 2023-08-20 RX ADMIN — HYDROCODONE BITARTRATE AND ACETAMINOPHEN PRN TAB: 10; 325 TABLET ORAL at 00:33

## 2023-08-20 RX ADMIN — HYDROMORPHONE HYDROCHLORIDE PRN MG: 1 INJECTION, SOLUTION INTRAMUSCULAR; INTRAVENOUS; SUBCUTANEOUS at 04:02

## 2023-08-20 RX ADMIN — HYDROCODONE BITARTRATE AND ACETAMINOPHEN PRN TAB: 10; 325 TABLET ORAL at 08:41

## 2023-08-20 RX ADMIN — METRONIDAZOLE SCH MLS/HR: 500 SOLUTION INTRAVENOUS at 10:39

## 2023-08-20 NOTE — NUR
Patient remains agitated and hostile towards staff, trying to record conversations. All Dc 
instructions given to patient . patient DC to lobby,Stated that ride is coming.

## 2023-08-20 NOTE — NUR
Problems reprioritized. Patient report given, questions answered & plan of care reviewed 
with SHIVANI Monsivais.

## 2023-08-20 NOTE — NUR
Patient in room [facility]011. I have received report from WERO,rn and had the opportunity to 
ask questions and assume patient care.

## 2023-08-20 NOTE — NUR
patient medicated for pain x1 with mod relief, patient became extremely agitated following 
Visit by Dr lopez, stating that she "looked at her wrong" and didnt like the way she spoke to 
her. Staff had observed Dr lopez to be professional and courteous.  patient  stated she was 
going to leave and continued to be very upset despite much time spent listening to patient. 
patient was positive for fentanyl, cocaine,cannaboids and opiates on admit. When asked about 
this , with regards withdrawals, patient denied taking drugs outside of hospital. will 
continue to monitor and endeavor to reassure patient.

## 2024-12-17 ENCOUNTER — HOSPITAL ENCOUNTER (EMERGENCY)
Dept: HOSPITAL 94 - ER | Age: 44
Discharge: LEFT BEFORE DISCHARGE | End: 2024-12-17
Payer: MEDICAID

## 2024-12-17 VITALS
HEART RATE: 84 BPM | TEMPERATURE: 98.7 F | OXYGEN SATURATION: 99 % | DIASTOLIC BLOOD PRESSURE: 82 MMHG | RESPIRATION RATE: 16 BRPM | SYSTOLIC BLOOD PRESSURE: 123 MMHG

## 2024-12-17 VITALS — WEIGHT: 147.6 LBS | BODY MASS INDEX: 28.98 KG/M2 | HEIGHT: 60 IN

## 2024-12-17 DIAGNOSIS — Z53.21: ICD-10-CM

## 2024-12-17 DIAGNOSIS — H57.11: Primary | ICD-10-CM
